# Patient Record
Sex: MALE | Race: WHITE | Employment: UNEMPLOYED | URBAN - METROPOLITAN AREA
[De-identification: names, ages, dates, MRNs, and addresses within clinical notes are randomized per-mention and may not be internally consistent; named-entity substitution may affect disease eponyms.]

---

## 2017-08-25 ENCOUNTER — OFFICE VISIT (OUTPATIENT)
Dept: FAMILY MEDICINE CLINIC | Age: 27
End: 2017-08-25

## 2017-08-25 VITALS
WEIGHT: 142 LBS | DIASTOLIC BLOOD PRESSURE: 80 MMHG | BODY MASS INDEX: 18.82 KG/M2 | SYSTOLIC BLOOD PRESSURE: 120 MMHG | HEIGHT: 73 IN

## 2017-08-25 DIAGNOSIS — M25.561 CHRONIC PAIN OF RIGHT KNEE: Primary | ICD-10-CM

## 2017-08-25 DIAGNOSIS — G89.29 CHRONIC PAIN OF RIGHT KNEE: Primary | ICD-10-CM

## 2017-08-25 PROCEDURE — 99204 OFFICE O/P NEW MOD 45 MIN: CPT | Performed by: PHYSICIAN ASSISTANT

## 2017-08-25 ASSESSMENT — ENCOUNTER SYMPTOMS
BACK PAIN: 0
SORE THROAT: 0
CONSTIPATION: 0
CHEST TIGHTNESS: 0
ABDOMINAL PAIN: 0
SHORTNESS OF BREATH: 0
VOICE CHANGE: 0
TROUBLE SWALLOWING: 0
DIARRHEA: 0
COUGH: 0
EYE PAIN: 0

## 2021-02-24 ENCOUNTER — HOSPITAL ENCOUNTER (EMERGENCY)
Age: 31
Discharge: HOME | End: 2021-02-24
Attending: EMERGENCY MEDICINE

## 2021-02-24 VITALS
RESPIRATION RATE: 18 BRPM | OXYGEN SATURATION: 97 % | TEMPERATURE: 99 F | BODY MASS INDEX: 18.73 KG/M2 | HEART RATE: 82 BPM | DIASTOLIC BLOOD PRESSURE: 71 MMHG | WEIGHT: 142 LBS | SYSTOLIC BLOOD PRESSURE: 126 MMHG

## 2021-02-24 DIAGNOSIS — Z23 NEED FOR TETANUS BOOSTER: ICD-10-CM

## 2021-02-24 DIAGNOSIS — S71.132A PUNCTURE WOUND OF LEFT THIGH, INITIAL ENCOUNTER: ICD-10-CM

## 2021-02-24 DIAGNOSIS — S81.812A LACERATION OF LEFT LOWER EXTREMITY, INITIAL ENCOUNTER: Primary | ICD-10-CM

## 2021-02-24 PROCEDURE — 6360000002 HC RX W HCPCS: Performed by: PHYSICIAN ASSISTANT

## 2021-02-24 PROCEDURE — 99284 EMERGENCY DEPT VISIT MOD MDM: CPT

## 2021-02-24 PROCEDURE — 90715 TDAP VACCINE 7 YRS/> IM: CPT | Performed by: PHYSICIAN ASSISTANT

## 2021-02-24 PROCEDURE — 6370000000 HC RX 637 (ALT 250 FOR IP): Performed by: PHYSICIAN ASSISTANT

## 2021-02-24 PROCEDURE — 90471 IMMUNIZATION ADMIN: CPT | Performed by: PHYSICIAN ASSISTANT

## 2021-02-24 PROCEDURE — 12001 RPR S/N/AX/GEN/TRNK 2.5CM/<: CPT

## 2021-02-24 RX ORDER — CEPHALEXIN 500 MG/1
500 CAPSULE ORAL 4 TIMES DAILY
Qty: 40 CAPSULE | Refills: 0 | Status: SHIPPED | OUTPATIENT
Start: 2021-02-24 | End: 2021-03-06

## 2021-02-24 RX ORDER — CEPHALEXIN 250 MG/1
500 CAPSULE ORAL ONCE
Status: COMPLETED | OUTPATIENT
Start: 2021-02-24 | End: 2021-02-24

## 2021-02-24 RX ADMIN — CEPHALEXIN 500 MG: 250 CAPSULE ORAL at 22:44

## 2021-02-24 RX ADMIN — TETANUS TOXOID, REDUCED DIPHTHERIA TOXOID AND ACELLULAR PERTUSSIS VACCINE, ADSORBED 0.5 ML: 5; 2.5; 8; 8; 2.5 SUSPENSION INTRAMUSCULAR at 22:42

## 2021-02-24 ASSESSMENT — PAIN SCALES - GENERAL: PAINLEVEL_OUTOF10: 10

## 2021-02-24 ASSESSMENT — ENCOUNTER SYMPTOMS
NAUSEA: 0
COUGH: 0
DIARRHEA: 0
RHINORRHEA: 0
ABDOMINAL PAIN: 0
WHEEZING: 0
VOMITING: 0
SHORTNESS OF BREATH: 0

## 2021-02-25 NOTE — ED NOTES
Writer discussed discharge instructions with patient as directed by ED provider. Pt verbalized understanding of follow-up, medications and pain management. Pt denies further questions and left ED in stable condition.        Sherri Johnson RN  02/24/21 2153

## 2021-02-25 NOTE — ED PROVIDER NOTES
As physician-in-triage, I performed a medical screening history and physical exam on Dayla Abu. I also cared for and evaluated the patient in conjunction with the ED Advanced Practice Provider. All diagnostic, treatment, and disposition decisions were made by myself in conjunction with the advanced practice provider. For all further details of the patient's emergency department visit, please see the advanced practice provider's documentation. Patient presents to the ER for evaluation of penetrating trauma left medial thigh he has normal abduction abduction flexion extension there is no involvement of the rectum scrotum there is no crepitus of the wound no residual foreign body wound cleansed irrigated sutured,'s please see AI note. Oral antibiotics activity as tolerated anti-inflammatories no neurovascular deficit in no involvement of the rectum.   Tetanus was updated      Impression: Medial thigh puncture wound infra buttock puncture wound     Adeel Durand MD  58/68/85 9423       Adeel Durand MD  67/79/54 1102

## 2021-02-25 NOTE — ED NOTES
Pt presented to the ED after he received a laceration while jumping a fence. Pt had laceration approx 1cm x 2cm x 3cm. NP at bedside to stitch wound. 3 stitches placed and wound covered with bandaid. Pt medicated as ordered.      Can Kirk RN  02/24/21 7448

## 2021-02-25 NOTE — ED PROVIDER NOTES
[facility]        Pt Name: Mattie Hansen  MRN: [id number]  [place] 1990  Date of evaluation: 2/24/2021  Provider: Margot Melendez PA-C  PCP: WILMA Gatica     I have seen and evaluated this patient with my supervising physician London Gross MD.    [address] [place] [address]       Chief Complaint   Patient presents with    Laceration     pt states was attempting to jump a fence and obtained laceration to inner thigh. HISTORY OF PRESENT ILLNESS   (Location, Timing/Onset, Context/Setting, Quality, Duration, Modifying Factors, Severity, Associated Signs and Symptoms)  Note limiting factors. Mattie Hansen is a 35 y.o. male who presents for evaluation of puncture wound to his left proximal inner thigh. Patient states that he was attempting to jump a fence when the tree branch that he was holding onto snapped and he fell, impaling himself on the top of a spear fence post.  This happened about 4 hours prior to arrival in the ED. He has been ambulatory since the injury. Bleeding is controlled. He denies any testicular or penile involvement. No numbness tingling or weakness distally. He states tetanus is not up-to-date. He has no other injuries or complaints at this time. Nursing Notes were all reviewed and agreed with or any disagreements were addressed in the HPI. REVIEW OF SYSTEMS    (2-9 systems for level 4, 10 or more for level 5)     Review of Systems   Constitutional: Negative for appetite change, chills and fever. HENT: Negative for congestion and rhinorrhea. Respiratory: Negative for cough, shortness of breath and wheezing. Cardiovascular: Negative for chest pain. Gastrointestinal: Negative for abdominal pain, diarrhea, nausea and vomiting. Genitourinary: Negative for difficulty urinating, dysuria and hematuria. Musculoskeletal: Negative for neck pain and neck stiffness.    Skin: Positive for wound. Negative for rash. Neurological: Negative for weakness, numbness and headaches. Positives and Pertinent negatives as per HPI. Except as noted above in the ROS, all other systems were reviewed and negative. PAST MEDICAL HISTORY   History reviewed. No pertinent past medical history. SURGICAL HISTORY     Past Surgical History:   Procedure Laterality Date    WISDOM TOOTH EXTRACTION           CURRENTMEDICATIONS       Previous Medications    No medications on file         ALLERGIES     Patient has no known allergies. FAMILYHISTORY       Family History   Problem Relation Age of Onset    Other Father         aortic aneurysm    Cancer Sister         cervical          SOCIAL HISTORY       Social History     Tobacco Use    Smoking status: Never Smoker    Smokeless tobacco: Never Used   Substance Use Topics    Alcohol use: Yes     Comment: couple drinks nightly    Drug use: No     Comment: used to smoke marijuana daily       SCREENINGS             PHYSICAL EXAM    (up to 7 for level 4, 8 or more for level 5)     ED Triage Vitals [02/24/21 2202]   BP Temp Temp src Pulse Resp SpO2 Height Weight   -- -- -- -- -- -- -- 142 lb (64.4 kg)       Physical Exam  Vitals signs and nursing note reviewed. Constitutional:       Appearance: He is well-developed. He is not diaphoretic. HENT:      Head: Normocephalic and atraumatic. Right Ear: External ear normal.      Left Ear: External ear normal.      Nose: Nose normal.   Eyes:      General:         Right eye: No discharge. Left eye: No discharge. Neck:      Musculoskeletal: Normal range of motion and neck supple. Pulmonary:      Effort: Pulmonary effort is normal. No respiratory distress. Musculoskeletal: Normal range of motion. Skin:     General: Skin is warm and dry. Neurological:      Mental Status: He is alert and oriented to person, place, and time.    Psychiatric:         Behavior: Behavior normal. DIAGNOSTIC RESULTS   LABS:    Labs Reviewed - No data to display    All other labs were within normal range or not returned as of this dictation. EKG: All EKG's are interpreted by the Emergency Department Physician in the absence of a cardiologist.  Please see their note for interpretation of EKG. RADIOLOGY:   Non-plain film images such as CT, Ultrasound and MRI are read by the radiologist. Plain radiographic images are visualized and preliminarily interpreted by the ED Provider with the below findings:        Interpretation per the Radiologist below, if available at the time of this note:    No orders to display     No results found. PROCEDURES   Unless otherwise noted below, none     Procedures  Laceration Repair Procedure Note    Indication: Laceration    Procedure: The patient was placed in the appropriate position and anesthesia around the laceration was obtained by infiltration using 1% Lidocaine with epinephrine. The area was then cleansed with betadine and draped in a sterile fashion, irrigated with high pressure Shur-Clens and normal saline solution and explored with no foreign bodies discovered and no tendon injury noted. The laceration was closed with 4-0 Ethilon using interrupted sutures. There were no additional lacerations requiring repair. The wound area was then dressed with bacitracin and a bandage. Total repaired wound length: 2 cm. Other Items: Suture count: 3    The patient tolerated the procedure well.     Complications: None      CRITICAL CARE TIME   N/A    CONSULTS:  None      EMERGENCY DEPARTMENT COURSE and DIFFERENTIAL DIAGNOSIS/MDM:   Vitals:    Vitals:    02/24/21 2202   Weight: 142 lb (64.4 kg)       Patient was given the following medications:  Medications   Tetanus-Diphth-Acell Pertussis (BOOSTRIX) injection 0.5 mL (0.5 mLs Intramuscular Given 2/24/21 2242)   cephALEXin (KEFLEX) capsule 500 mg (500 mg Oral Given 2/24/21 2244)           Patient presents for evaluation of puncture wound/laceration to proximal inner left thigh as illustrated above. On exam, he is resting comfortably in bed in no acute distress and nontoxic. Vitals are stable and he is afebrile. He does have a 1.5 cm laceration that is approximately 3 cm deep. No active bleeding noted. He is neurovascularly intact distally. Range of motion at the hip is intact including adduction, abduction and flexion extension. No rectal involvement. No genitalia involvement. Tetanus is updated in the ED. Area was copiously irrigated and approximated per procedure note patient tolerated without difficulty. Covered empirically with Keflex as prophylaxis. Laceration repaired per procedure note patient tolerated that difficulty. Symptomatic, supportive and wound care was discussed as well as follow-up for suture removal in 10 to 12 days. Conditions for return to the ED were also discussed such as any new or worsening symptoms or signs of neurovascular compromise, intractable pain, wound dehiscence/rebleed or infection. He is agreeable to this plan and stable for discharge at this time. FINAL IMPRESSION      1. Laceration of left lower extremity, initial encounter    2. Puncture wound of left thigh, initial encounter    3.  Need for tetanus booster          DISPOSITION/PLAN   DISPOSITION        PATIENT REFERREDTO:  Shanique Olvera[address] [place] [address]  [phone]    Call   For suture removal in 10-12 days    [facility] Emergency Department  [address] [facility]  [phone]  Go to   If symptoms worsen      DISCHARGE MEDICATIONS:  New Prescriptions    CEPHALEXIN (KEFLEX) 500 MG CAPSULE    Take 1 capsule by mouth 4 times daily for 10 days       DISCONTINUED MEDICATIONS:  Discontinued Medications    No medications on file              (Please note that portions of this note were completed with a voice recognition program.  Efforts were made to edit the dictations but occasionally words are mis-transcribed.)    Salima Reyes PA-C (electronically signed)           [facility], [place]  02/24/21 4891

## 2021-02-26 ENCOUNTER — TELEPHONE (OUTPATIENT)
Dept: FAMILY MEDICINE CLINIC | Age: 31
End: 2021-02-26

## 2021-02-26 NOTE — TELEPHONE ENCOUNTER
----- Message from Igor Carbajal sent at 2/26/2021  1:59 PM EST -----  Subject: Message to Provider    QUESTIONS  Information for Provider? Patient needs to schedule an appointment for   suture removal.  ---------------------------------------------------------------------------  --------------  CALL BACK INFO  What is the best way for the office to contact you? OK to leave message on   voicemail  Preferred Call Back Phone Number? [phone]  ---------------------------------------------------------------------------  --------------  SCRIPT ANSWERS  Relationship to Patient? Self  Appointment reason? Symptomatic  Select script based on patient symptoms? Adult No Script   (Is the patient requesting to see the provider for a procedure?)?  Yes

## 2021-03-05 ENCOUNTER — OFFICE VISIT (OUTPATIENT)
Dept: FAMILY MEDICINE CLINIC | Age: 31
End: 2021-03-05

## 2021-03-05 VITALS
BODY MASS INDEX: 19.92 KG/M2 | TEMPERATURE: 97.2 F | OXYGEN SATURATION: 100 % | SYSTOLIC BLOOD PRESSURE: 112 MMHG | HEART RATE: 59 BPM | DIASTOLIC BLOOD PRESSURE: 64 MMHG | WEIGHT: 151 LBS

## 2021-03-05 DIAGNOSIS — Z48.02 VISIT FOR SUTURE REMOVAL: Primary | ICD-10-CM

## 2021-03-05 DIAGNOSIS — S81.812A LACERATION OF LEFT LOWER LEG, INITIAL ENCOUNTER: ICD-10-CM

## 2021-03-05 PROCEDURE — 99212 OFFICE O/P EST SF 10 MIN: CPT | Performed by: PHYSICIAN ASSISTANT

## 2021-03-05 ASSESSMENT — ENCOUNTER SYMPTOMS
BACK PAIN: 0
ABDOMINAL PAIN: 0
COUGH: 0
SHORTNESS OF BREATH: 0

## 2021-03-05 ASSESSMENT — PATIENT HEALTH QUESTIONNAIRE - PHQ9
SUM OF ALL RESPONSES TO PHQ QUESTIONS 1-9: 0

## 2021-03-05 NOTE — PROGRESS NOTES
Subjective:      Patient ID: Alicia Gutiérrez is a 35 y.o. male. HPI Patient is here today for suture removal. He tried to climb a fence and fell. Lacerated left lower buttocks area. Got Tdap in ER. No problems or complications. He has 3 sutures there. They have been in for 10 days. Review of Systems   Constitutional: Negative for fatigue and unexpected weight change. HENT: Negative for nosebleeds. Eyes: Negative for visual disturbance. Respiratory: Negative for cough and shortness of breath. Cardiovascular: Negative for chest pain, palpitations and leg swelling. Gastrointestinal: Negative for abdominal pain. Musculoskeletal: Negative for back pain. Skin: Positive for wound (left lower buttocks laceration). Neurological: Negative for dizziness and headaches. Objective:   Physical Exam  Vitals signs reviewed. Constitutional:       Appearance: Normal appearance. He is well-developed and well-groomed. Musculoskeletal:      Comments: Full ROM and strength both lower extremities   Skin:     Comments: Left lower buttocks with a 1\" linear laceration, there are 3 sutures c/d/i, healing well, sutures removed without difficulty, steri strips placed for caution but laceration is closed   Neurological:      Mental Status: He is alert and oriented to person, place, and time. Sensory: Sensation is intact. Psychiatric:         Attention and Perception: Attention normal.         Mood and Affect: Mood normal.         Speech: Speech normal.         Behavior: Behavior normal. Behavior is cooperative. Assessment:      Lawrence Major was seen today for suture / staple removal.    Diagnoses and all orders for this visit:    Visit for suture removal    Laceration of left lower leg, initial encounter             Plan:        Removed sutures, call with any concerns.      Venkatesh Meier